# Patient Record
Sex: FEMALE | Race: AMERICAN INDIAN OR ALASKA NATIVE | URBAN - METROPOLITAN AREA
[De-identification: names, ages, dates, MRNs, and addresses within clinical notes are randomized per-mention and may not be internally consistent; named-entity substitution may affect disease eponyms.]

---

## 2022-06-28 ENCOUNTER — OFFICE VISIT (OUTPATIENT)
Dept: URBAN - METROPOLITAN AREA CLINIC 13 | Facility: CLINIC | Age: 32
End: 2022-06-28
Payer: OTHER GOVERNMENT

## 2022-06-28 DIAGNOSIS — H31.321 CHOROIDAL RUPTURE, RIGHT EYE: Primary | ICD-10-CM

## 2022-06-28 DIAGNOSIS — H35.051 RETINAL NEOVASCULARIZATION OF RIGHT EYE: ICD-10-CM

## 2022-06-28 DIAGNOSIS — S05.11XA CONTUSION OF EYEBALL AND ORBITAL TISSUES, RIGHT EYE, INIT: ICD-10-CM

## 2022-06-28 PROCEDURE — 99204 OFFICE O/P NEW MOD 45 MIN: CPT | Performed by: OPHTHALMOLOGY

## 2022-06-28 PROCEDURE — 92134 CPTRZ OPH DX IMG PST SGM RTA: CPT | Performed by: OPHTHALMOLOGY

## 2022-06-28 ASSESSMENT — INTRAOCULAR PRESSURE
OS: 12
OD: 8

## 2022-06-28 NOTE — IMPRESSION/PLAN
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 Plan: Exam and OCT demonstrate SRF and SRH and choroidal rupture. 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 The diagnosis, natural history, and prognosis of wet AMD were discussed. The R/B/As of various treatment options including observation, laser (PDT), and intravitreal Anti-VEGF injections were discussed. Participation in a clinical trial was also discussed. The patient understands that treatment may not improve vision, but should reduce the risk of further visual loss. The patient also understands the likely need for chronic treatment and the risk of vision loss without treatment. Recommend intravitreal Avastin injection today. Will schedule due to authorization.  

RTC 1 weeks re-eval with OCT OU, optos color OU, possible Avastin OD
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
## 2022-06-28 NOTE — IMPRESSION/PLAN
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 Plan: Exam and OCT and optos color today demonstrates a traumatic choroidal rupture with SRF and SRH. 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 Discussed guarded prognosis.

## 2022-07-06 ENCOUNTER — OFFICE VISIT (OUTPATIENT)
Dept: URBAN - METROPOLITAN AREA CLINIC 13 | Facility: CLINIC | Age: 32
End: 2022-07-06
Payer: OTHER GOVERNMENT

## 2022-07-06 DIAGNOSIS — H35.051 RETINAL NEOVASCULARIZATION OF RIGHT EYE: Primary | ICD-10-CM

## 2022-07-06 DIAGNOSIS — S05.11XA CONTUSION OF EYEBALL AND ORBITAL TISSUES, RIGHT EYE, INIT: ICD-10-CM

## 2022-07-06 DIAGNOSIS — H31.321 CHOROIDAL RUPTURE, RIGHT EYE: ICD-10-CM

## 2022-07-06 PROCEDURE — 92134 CPTRZ OPH DX IMG PST SGM RTA: CPT | Performed by: OPHTHALMOLOGY

## 2022-07-06 PROCEDURE — 67028 INJECTION EYE DRUG: CPT | Performed by: OPHTHALMOLOGY

## 2022-07-06 PROCEDURE — 92014 COMPRE OPH EXAM EST PT 1/>: CPT | Performed by: OPHTHALMOLOGY

## 2022-07-06 ASSESSMENT — INTRAOCULAR PRESSURE
OD: 9
OS: 12

## 2022-07-06 NOTE — IMPRESSION/PLAN
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 Plan: Exam and OCT demonstrate persistent SRF and SRH and choroidal rupture. The findings are consistent with CNVM due to recent trauma. The diagnosis, natural history, and prognosis of wet AMD were discussed. The R/B/As of various treatment options including observation, laser (PDT), and intravitreal Anti-VEGF injections were discussed. Participation in a clinical trial was also discussed. The patient understands that treatment may not improve vision, but should reduce the risk of further visual loss. The patient also understands the likely need for chronic treatment and the risk of vision loss without treatment. Recommend intravitreal Avastin injection today. Intravitreal Avastin injected OD today without complication.  

RTC 4-5 weeks re-eval with OCT OU, optos color OU, possible Avastin OD
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
## 2022-07-06 NOTE — IMPRESSION/PLAN
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 Plan: Exam and OCT and optos color today demonstrates a traumatic choroidal rupture with SRF and SRH. Rec Avastin tx for secondary CNVM. Discussed guarded prognosis.

## 2022-08-03 ENCOUNTER — OFFICE VISIT (OUTPATIENT)
Dept: URBAN - METROPOLITAN AREA CLINIC 13 | Facility: CLINIC | Age: 32
End: 2022-08-03
Payer: OTHER GOVERNMENT

## 2022-08-03 DIAGNOSIS — S05.11XA CONTUSION OF EYEBALL AND ORBITAL TISSUES, RIGHT EYE, INIT: ICD-10-CM

## 2022-08-03 DIAGNOSIS — H31.321 CHOROIDAL RUPTURE, RIGHT EYE: ICD-10-CM

## 2022-08-03 DIAGNOSIS — H35.051 RETINAL NEOVASCULARIZATION OF RIGHT EYE: Primary | ICD-10-CM

## 2022-08-03 PROCEDURE — 99214 OFFICE O/P EST MOD 30 MIN: CPT | Performed by: OPHTHALMOLOGY

## 2022-08-03 PROCEDURE — 92235 FLUORESCEIN ANGRPH MLTIFRAME: CPT | Performed by: OPHTHALMOLOGY

## 2022-08-03 PROCEDURE — 92134 CPTRZ OPH DX IMG PST SGM RTA: CPT | Performed by: OPHTHALMOLOGY

## 2022-08-03 PROCEDURE — 67028 INJECTION EYE DRUG: CPT | Performed by: OPHTHALMOLOGY

## 2022-08-03 ASSESSMENT — INTRAOCULAR PRESSURE
OS: 11
OD: 9

## 2022-08-03 NOTE — IMPRESSION/PLAN
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 Plan: Exam and OCT and optos color today demonstrates an improved traumatic choroidal rupture with improved SRF and SRH. Rec Avastin tx for secondary CNVM. Discussed guarded prognosis.

## 2022-08-03 NOTE — IMPRESSION/PLAN
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
-s/p Avastin last 07/06/2022 Plan: Exam and OCT demonstrate improved SRF and SRH and choroidal rupture after Avastin 4 wks. The findings are consistent with CNVM due to recent trauma. Recommend intravitreal Avastin injection today and extend to 8 wks Intravitreal Avastin injected OD today without complication.  

RTC 8 weeks re-eval with OCT OU, optos color OU, possible Avastin OD
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
